# Patient Record
(demographics unavailable — no encounter records)

---

## 2025-01-28 NOTE — ADDENDUM
[FreeTextEntry1] : Entered by Brock Gilliam, acting as scribe for Dr. José Neal. The documentation recorded by the scribe accurately reflects the service I personally performed and the decisions made by me.

## 2025-01-28 NOTE — LETTER BODY
[FreeTextEntry1] : [facility] [address] [place] [address] [phone]  Dear Dr. Frances,        Reason for Visit: Elevated PSA.       This is a 75 year-old gentleman with elevated PSA. Patient reports that he has had previous prostate biopsy in 2023 which was negative. Patient denies any hematuria or lower urinary tract symptoms. He denies any pain. The patient denies any aggravating or relieving factors. The patient denies any interference of function. The patient is entirely asymptomatic. All other review of systems are negative. He has no cancer in his family medical history. He has no previous surgical history. Past medical history, family history and social history were inquired and were noncontributory to current condition. The patient does not use tobacco or drink alcohol. Medications and allergies were reviewed. He has no known allergies to medication.       On examination, the patient is a healthy-appearing gentleman in no acute distress. He is alert and oriented follows commands. He has normal mood and affect. He is normocephalic. Neck is supple. Oral no thrush. Respirations are unlabored. His abdomen is soft and nontender. Bladder is nonpalpable. No CVA tenderness. Neurologically he is grossly intact. No peripheral edema. Skin without gross abnormality.      Assessment: Elevated PSA.       I counseled the patient. His prostate biopsy from 2023 was negative. I reassured the patient as his results were not indicative of prostate cancer. I recommended the patient repeat PSA and BMP to ensure stability. I also recommended the patient obtain urinalysis, urine culture, and urine cytology to evaluate for infections and high grade urothelial carcinoma. The patient will follow-up as directed and will contact me with any questions or concerns. Thank you for the opportunity to participate in the care of Mr. TRIMBLE. I will keep you updated on his progress.       Plan: PSA. BMP. Urinalysis. Urine culture. Urine cytology. Follow-up in 1 year.

## 2025-01-28 NOTE — HISTORY OF PRESENT ILLNESS
[FreeTextEntry1] : new elevated PSA negative biopsy 2023 repeat PSA Followup in 1 year   Please refer to URO Consult Note.